# Patient Record
Sex: FEMALE | Race: AMERICAN INDIAN OR ALASKA NATIVE
[De-identification: names, ages, dates, MRNs, and addresses within clinical notes are randomized per-mention and may not be internally consistent; named-entity substitution may affect disease eponyms.]

---

## 2019-08-31 NOTE — EMERGENCY DEPARTMENT REPORT
ED Female  HPI





- General


Chief complaint: Urogenital-Female


Stated complaint: ABNORMAL DISCHARGE


Time Seen by Provider: 19 09:09


Source: patient


Mode of arrival: Ambulatory


Limitations: No Limitations





- History of Present Illness


Initial comments: 





This is a 19-year-old -American female who presents to the emergency room

with vaginal discharge, urinary frequency, and dysuria for 2-3 days.  Patient 

reports possible risk of sexually transmitted diseases.  Last menstrual period 

2019,  A0.  She denies abdominal pain, back pain, nausea, vomiting, 

chills, or fever.


MD Complaint: vaginal discharge, possible STD


Onset/Timing: 3


-: days(s)


Location: suprapubic


Radiation: non-radiating


Severity: mild


Severity scale (0 -10): 3


Quality: cramping, burning


Consistency: intermittent


Improves with: none


Worsens with: urination


Are you Pregnant Now?: No


Last Menstrual Period: 19


EDC: 05/15/20


Associated Symptoms: vaginal discharge, abdominal pain.  denies: vaginal 

bleeding, nausea/vomiting, fever/chills, headaches, loss of appetite, dysuria, 

hematuria, rash, seizure, shortness of breath, syncope, weakness





- Related Data


Sexually active: Yes


: 1


Para: 1


A: 0


                                  Previous Rx's











 Medication  Instructions  Recorded  Last Taken  Type


 


Sulfamethoxazole/Trimethoprim 1 each PO BID #6 tablet 19 Unknown Rx





[Bactrim DS TAB]    











                                    Allergies











Allergy/AdvReac Type Severity Reaction Status Date / Time


 


No Known Allergies Allergy   Unverified 19 08:43














ED Review of Systems


ROS: 


Stated complaint: ABNORMAL DISCHARGE


Other details as noted in HPI





Constitutional: denies: chills, fever


Respiratory: denies: cough, shortness of breath, wheezing


Cardiovascular: denies: chest pain, palpitations


Gastrointestinal: abdominal pain.  denies: nausea, diarrhea


Genitourinary: urgency, dysuria, frequency, discharge.  denies: hematuria, 

abnormal menses, dyspareunia


Musculoskeletal: denies: back pain, joint swelling, arthralgia


Skin: denies: rash, lesions


Neurological: denies: headache, weakness, paresthesias


Psychiatric: denies: anxiety, depression





ED Past Medical Hx





- Past Medical History


Previous Medical History?: Yes


Additional medical history: Anemia





- Surgical History


Past Surgical History?: No





- Social History


Smoking Status: Never Smoker


Substance Use Type: None





- Medications


Home Medications: 


                                Home Medications











 Medication  Instructions  Recorded  Confirmed  Last Taken  Type


 


Sulfamethoxazole/Trimethoprim 1 each PO BID #6 tablet 19  Unknown Rx





[Bactrim DS TAB]     














ED Physical Exam





- General


Limitations: No Limitations


General appearance: alert, in no apparent distress





- Respiratory


Respiratory exam: Present: normal lung sounds bilaterally.  Absent: respiratory 

distress





- Cardiovascular


Cardiovascular Exam: Present: regular rate, normal rhythm.  Absent: systolic 

murmur, diastolic murmur, rubs, gallop





- GI/Abdominal


GI/Abdominal exam: Present: soft, normal bowel sounds.  Absent: distended, 

tenderness, guarding, rebound, rigid





- 


External exam: Present: normal external exam


Speculum exam: Present: vaginal discharge (malodorous yellowish white).  Absent:

erythema, cervical discharge, vaginal bleeding, foreign body, tissue, laceration


Bi-manual exam: Present: normal bi-manual exam.  Absent: cervical motion 

tendernes, adnexal tenderness, uterine enlargement, uterine tenderness





- Back Exam


Back exam: Absent: CVA tenderness (R), CVA tenderness (L)





- Neurological Exam


Neurological exam: Present: alert, oriented X3, normal gait





- Psychiatric


Psychiatric exam: Present: normal affect, normal mood





- Skin


Skin exam: Present: warm, dry, intact, normal color.  Absent: rash





ED Course


                                   Vital Signs











  19





  08:43


 


Temperature 98.2 F


 


Pulse Rate 90


 


Respiratory 18





Rate 


 


Blood Pressure 102/57


 


O2 Sat by Pulse 100





Oximetry 














ED Medical Decision Making





- Lab Data








                                   Lab Results











  19 Range/Units





  08:55 


 


Urine Color  Yellow  (Yellow)  


 


Urine Turbidity  Clear  (Clear)  


 


Urine pH  6.0  (5.0-7.0)  


 


Ur Specific Gravity  1.030  (1.003-1.030)  


 


Urine Protein  <15 mg/dl  (Negative)  mg/dL


 


Urine Glucose (UA)  Neg  (Negative)  mg/dL


 


Urine Ketones  Neg  (Negative)  mg/dL


 


Urine Blood  Mod  (Negative)  


 


Urine Nitrite  Neg  (Negative)  


 


Ur Reducing Substances  Not Reportable  


 


Urine Bilirubin  Neg  (Negative)  


 


Urine Ictotest  Not Reportable  


 


Urine Urobilinogen  2.0  (<2.0)  mg/dL


 


Ur Leukocyte Esterase  Sm  (Negative)  


 


Urine WBC (Auto)  8.0 H  (0.0-6.0)  /HPF


 


Urine RBC (Auto)  49.0  (0.0-6.0)  /HPF


 


U Epithel Cells (Auto)  1.0  (0-13.0)  /HPF


 


Urine Bacteria (Auto)  1+  (Negative)  /HPF


 


Urine Mucus  3+  /HPF


 


Urine HCG, Qual  Negative  (Negative)  














- Medical Decision Making





Patient was examined by me.  Vitals are stable and in no acute distress.  

Urinalysis positive for UTI and urine pregnancy test negative.  Pelvic exam 

performed and wet prep and GC retrieved.  The wet prep is negative 

trich/yeast/clue cells.  Pending GC results.   Empirically treated with Rocephin

250 mg IM and azithromycin 1 g by mouth.  Instructed to follow up in 3-5 days 

for pending lab results.  Discharged home in stable condition.  Discussed 

prevention options. F/U with PCP or gynecology.


Critical care attestation.: 


If time is entered above; I have spent that time in minutes in the direct care 

of this critically ill patient, excluding procedure time.








ED Disposition


Clinical Impression: 


 Vaginal discharge, Dysuria, Urinary frequency, Exposure to STD





Acute cystitis


Qualifiers:


 Hematuria presence: with hematuria Qualified Code(s): N30.01 - Acute cystitis 

with hematuria





Disposition:  TO HOME OR SELFCARE


Is pt being admited?: No


Does the pt Need Aspirin: No


Condition: Stable


Instructions:  Urinary Tract Infection in Women (ED), Safe Sex (ED), Sexually 

Transmitted Diseases (ED)


Additional Instructions: 


Complete full course of antibiotics.





Return to medical records for a copy of your lab results in 3-5 days.





Follow up with a primary care doctor or gynecologist.


Prescriptions: 


Sulfamethoxazole/Trimethoprim [Bactrim DS TAB] 1 each PO BID #6 tablet


Referrals: 


[facility] [Outside] - 3-5 Days


[facility] [Outside] - 3-5 Days


[facility] [Outside] - 3-5 Days


Forms:  Work/School Release Form(ED)


Time of Disposition: 10:25

## 2019-09-18 NOTE — EMERGENCY DEPARTMENT REPORT
ED Back Pain/Injury HPI





- General


Chief Complaint: Abdominal Pain


Stated Complaint: RIGHT SIDE PAIN/BACK PAIN


Time Seen by Provider: 09/18/19 18:53


Source: patient


Limitations: No Limitations





- History of Present Illness


Initial Comments: 





19-year-old female department complaining of pain to her right flank posterior 

spontaneous nature for about 3 days.  States that she was sleeping she woke up 

with pain on the right side which is continuous.  The patient progressively 

worsening waxing waning fashion.  She reports no nausea, vomiting, no vaginal 

bleeding, no dysuria, no hematuria, fever, chills, sweats.  No constipation.  No

trauma.  No rashes.  Reports no history of any kidney stones.  She has tried 

some over-the-counter remedies for her pain with no improvement.


MD Complaint: back pain


-: days(s)


Similar Symptoms Previously: No


Radiation: none


Severity: moderate


Quality: sharp, dull


Consistency: constant


Worsens With: movement


Context: unknown


Associated Symptoms: denies: confusion, weakness, numbness, cough, incontinence,

fever/chills, constipation, abdominal pain, nausea/vomiting, rash, seizure, 

shortness of breath





- Related Data


                                  Previous Rx's











 Medication  Instructions  Recorded  Last Taken  Type


 


Sulfamethoxazole/Trimethoprim 1 each PO BID #6 tablet 08/31/19 Unknown Rx





[Bactrim DS TAB]    


 


Ketorolac [Toradol] 10 mg PO Q6H PRN #15 tablet 09/18/19 Unknown Rx


 


methOCARBAMOL [Robaxin TAB] 750 mg PO Q8H PRN #14 tablet 09/18/19 Unknown Rx











                                    Allergies











Allergy/AdvReac Type Severity Reaction Status Date / Time


 


No Known Allergies Allergy   Unverified 08/31/19 08:43














ED Review of Systems


ROS: 


Stated complaint: RIGHT SIDE PAIN/BACK PAIN


Other details as noted in HPI





Comment: All other systems reviewed and negative





ED Past Medical Hx





- Past Medical History


Previous Medical History?: No


Hx Hypertension: No


Hx CVA: No


Hx Heart Attack/AMI: No


Hx Congestive Heart Failure: No


Hx Diabetes: No


Hx Deep Vein Thrombosis: No


Hx Pulmonary Embolism: No


Hx GERD: No


Hx Liver Disease: No


Hx Renal Disease: No


Hx of Cancer: No


Hx Sickle Cell Disease: No


Hx Arthritis: No


Hx Headaches / Migraines: No


Hx Seizures: No


Hx Kidney Stones: No


Hx Psychiatric Treatment: No


Hx Asthma: No


Hx COPD: No


Hx Tuberculosis: No


Hx Dementia: No


Hx HIV: No


Additional medical history: Anemia





- Surgical History


Past Surgical History?: No


Hx Coronary Stent: No


Hx Open Heart Surgery: No


Hx Pacemaker: No


Hx Internal Defibrillator: No


Hx Cholecystectomy: No


Hx Appendectomy: No


Hx Breast Surgery: No





- Social History


Smoking Status: Never Smoker





- Medications


Home Medications: 


                                Home Medications











 Medication  Instructions  Recorded  Confirmed  Last Taken  Type


 


Sulfamethoxazole/Trimethoprim 1 each PO BID #6 tablet 08/31/19  Unknown Rx





[Bactrim DS TAB]     


 


Ketorolac [Toradol] 10 mg PO Q6H PRN #15 tablet 09/18/19  Unknown Rx


 


methOCARBAMOL [Robaxin TAB] 750 mg PO Q8H PRN #14 tablet 09/18/19  Unknown Rx














ED Physical Exam





- General


Limitations: No Limitations


General appearance: alert, in no apparent distress





- Head


Head exam: Present: atraumatic, normocephalic





- Eye


Eye exam: Present: normal appearance, PERRL


Pupils: Present: normal accommodation





- ENT


ENT exam: Present: normal exam, mucous membranes moist





- Neck


Neck exam: Present: normal inspection





- Respiratory


Respiratory exam: Present: normal lung sounds bilaterally.  Absent: respiratory 

distress





- Cardiovascular


Cardiovascular Exam: Present: regular rate, normal rhythm.  Absent: systolic 

murmur, diastolic murmur, rubs, gallop





- GI/Abdominal


GI/Abdominal exam: Present: soft, normal bowel sounds





- Extremities Exam


Extremities exam: Present: normal inspection





- Back Exam


Back exam: Present: normal inspection, full ROM, tenderness (to the right 

paraspinous region with palpation.), paraspinal tenderness, other (full range of

motion noted.  Negative seated straight leg raise.).  Absent: CVA tenderness 

(R), CVA tenderness (L), vertebral tenderness





- Neurological Exam


Neurological exam: Present: alert, oriented X3





- Psychiatric


Psychiatric exam: Present: normal affect, normal mood





- Skin


Skin exam: Present: warm, dry, intact, normal color.  Absent: rash (no rashes 

visualized.  No discoloration.), diaphoretic, erythema, urticaria, pallor, 

abrasion, ecchymosis





ED Course





                                   Vital Signs











  09/18/19





  18:51


 


Temperature 98 F


 


Pulse Rate 77


 


Respiratory 18





Rate 


 


Blood Pressure 138/54


 


Blood Pressure 123/82





[Right] 


 


O2 Sat by Pulse 100





Oximetry 














ED Medical Decision Making





- Medical Decision Making





19-year-old American female with right lower back pain, atraumatic in nature 

with a normal urinalysis and normal pregnancy tests(urinalysis did delayed the 

discharge as it was unable to be located for 2 hours).  Symptoms appear to be 

more musculoskeletal given her medical examination of reproduce her reproducible

 muscular pain and pain with axillary rotation to the right.  Patient isn't was 

however no acute distress afebrile nontoxic


Critical care attestation.: 


If time is entered above; I have spent that time in minutes in the direct care 

of this critically ill patient, excluding procedure time.








ED Disposition


Clinical Impression: 


 Lower back pain, Pain in right paraspinal region





Disposition: DC-01 TO HOME OR SELFCARE


Is pt being admited?: No


Does the pt Need Aspirin: No


Condition: Stable


Instructions:  Back Pain (ED)


Prescriptions: 


methOCARBAMOL [Robaxin TAB] 750 mg PO Q8H PRN #14 tablet


 PRN Reason: Pain, Moderate (4-6)


Ketorolac [Toradol] 10 mg PO Q6H PRN #15 tablet


 PRN Reason: Pain


Referrals: 


[facility] [Provider Group] - 3-5 Days

## 2019-09-18 NOTE — EMERGENCY DEPARTMENT REPORT
Blank Doc





- Documentation


Documentation: 





19-year-old female that presents with right flank pain.





This initial assessment/diagnostic orders/clinical plan/treatment(s) is/are 

subject to change based on patient's health status, clinical progression and re-

assessment by fellow clinical providers in the ED.  Further treatment and workup

at subsequent clinical providers discretion.  Patient/guardians urged not to 

elope from the ED as their condition may be serious if not clinically assessed 

and managed.  Initial orders include:


1- Patient sent to [facility] for further evaluation and treatment


2- UA

## 2020-09-29 ENCOUNTER — HOSPITAL ENCOUNTER (EMERGENCY)
Dept: HOSPITAL 5 - ED | Age: 20
Discharge: HOME | End: 2020-09-29
Payer: MEDICAID

## 2020-09-29 DIAGNOSIS — Z79.899: ICD-10-CM

## 2020-09-29 DIAGNOSIS — Z86.2: ICD-10-CM

## 2020-09-29 DIAGNOSIS — N76.0: Primary | ICD-10-CM

## 2020-09-29 DIAGNOSIS — B96.89: ICD-10-CM

## 2020-09-29 LAB
BILIRUB UR QL STRIP: (no result)
BLOOD UR QL VISUAL: (no result)
MUCOUS THREADS #/AREA URNS HPF: (no result) /HPF
PH UR STRIP: 6 [PH] (ref 5–7)
PROT UR STRIP-MCNC: (no result) MG/DL
RBC #/AREA URNS HPF: 2 /HPF (ref 0–6)
UROBILINOGEN UR-MCNC: 2 MG/DL (ref ?–2)
WBC #/AREA URNS HPF: 3 /HPF (ref 0–6)

## 2020-09-29 PROCEDURE — 81001 URINALYSIS AUTO W/SCOPE: CPT

## 2020-09-29 PROCEDURE — 87210 SMEAR WET MOUNT SALINE/INK: CPT

## 2020-09-29 PROCEDURE — 99284 EMERGENCY DEPT VISIT MOD MDM: CPT

## 2020-09-29 PROCEDURE — 87591 N.GONORRHOEAE DNA AMP PROB: CPT

## 2020-09-29 PROCEDURE — 81025 URINE PREGNANCY TEST: CPT

## 2020-09-29 NOTE — EMERGENCY DEPARTMENT REPORT
ED Female  HPI





- General


Chief complaint: Urogenital-Female


Stated complaint: peeing a lot/burning/discharge


Time Seen by Provider: 09/29/20 15:37


Source: patient


Mode of arrival: Ambulatory


Limitations: No Limitations





- History of Present Illness


Initial comments: 





30-year-old F American female just emerged department complaining of a few day 

history of vaginal discharge watery in nature with an odor of unknown color and 

some suprapubic discomfort is minimal in nature.  Ports no back pain no no no 

vaginal bleeding, no fever, chills, sweats no rashes no nausea, no vomiting, no 

diarrhea, no constipation.  States she had an STD profile last month which was 

normal but worried she may have an another issue going on here today.


MD Complaint: possible STD


Location: suprapubic, other


Radiation: non-radiating


Quality: dull


Improves with: none


Worsens with: none


Are you Pregnant Now?: No





- Related Data


                                  Previous Rx's











 Medication  Instructions  Recorded  Last Taken  Type


 


Sulfamethoxazole/Trimethoprim 1 each PO BID #6 tablet 08/31/19 Unknown Rx





[Bactrim DS TAB]    


 


Ketorolac [Toradol] 10 mg PO Q6H PRN #15 tablet 09/18/19 Unknown Rx


 


methOCARBAMOL [Robaxin TAB] 750 mg PO Q8H PRN #14 tablet 09/18/19 Unknown Rx


 


metroNIDAZOLE [Flagyl] 500 mg PO Q12HR #20 tab 09/29/20 Unknown Rx











                                    Allergies











Allergy/AdvReac Type Severity Reaction Status Date / Time


 


No Known Allergies Allergy   Unverified 08/31/19 08:43














ED Review of Systems


ROS: 


Stated complaint: peeing a lot/burning/discharge


Other details as noted in HPI





Comment: All other systems reviewed and negative





ED Past Medical Hx





- Past Medical History


Hx Hypertension: No


Hx CVA: No


Hx Heart Attack/AMI: No


Hx Congestive Heart Failure: No


Hx Diabetes: No


Hx Deep Vein Thrombosis: No


Hx Pulmonary Embolism: No


Hx GERD: No


Hx Liver Disease: No


Hx Renal Disease: No


Hx Sickle Cell Disease: No


Hx Arthritis: No


Hx Headaches / Migraines: No


Hx Seizures: No


Hx Kidney Stones: No


Hx Psychiatric Treatment: No


Hx Asthma: No


Hx COPD: No


Hx Tuberculosis: No


Hx Dementia: No


Hx HIV: No


Additional medical history: Anemia





- Surgical History


Hx Coronary Stent: No


Hx Open Heart Surgery: No


Hx Pacemaker: No


Hx Internal Defibrillator: No


Hx Cholecystectomy: No


Hx Appendectomy: No


Hx Breast Surgery: No





- Social History


Smoking Status: Never Smoker


Substance Use Type: Alcohol





- Medications


Home Medications: 


                                Home Medications











 Medication  Instructions  Recorded  Confirmed  Last Taken  Type


 


Sulfamethoxazole/Trimethoprim 1 each PO BID #6 tablet 08/31/19  Unknown Rx





[Bactrim DS TAB]     


 


Ketorolac [Toradol] 10 mg PO Q6H PRN #15 tablet 09/18/19  Unknown Rx


 


methOCARBAMOL [Robaxin TAB] 750 mg PO Q8H PRN #14 tablet 09/18/19  Unknown Rx


 


metroNIDAZOLE [Flagyl] 500 mg PO Q12HR #20 tab 09/29/20  Unknown Rx














ED Physical Exam





- General


Limitations: No Limitations


General appearance: alert, in no apparent distress





- Head


Head exam: Present: atraumatic, normocephalic





- Eye


Eye exam: Present: normal appearance





- ENT


ENT exam: Present: mucous membranes moist





- Neck


Neck exam: Present: normal inspection





- Respiratory


Respiratory exam: Present: normal lung sounds bilaterally.  Absent: respiratory 

distress





- Cardiovascular


Cardiovascular Exam: Present: regular rate, normal rhythm.  Absent: systolic 

murmur, diastolic murmur, rubs, gallop





- GI/Abdominal


GI/Abdominal exam: Present: soft, normal bowel sounds





- 


Speculum exam: Present: vaginal discharge





- Extremities Exam


Extremities exam: Present: normal inspection





- Back Exam


Back exam: Present: normal inspection





- Neurological Exam


Neurological exam: Present: alert, oriented X3





- Psychiatric


Psychiatric exam: Present: normal affect, normal mood





- Skin


Skin exam: Present: warm, dry, intact, normal color.  Absent: rash


Critical care attestation.: 


If time is entered above; I have spent that time in minutes in the direct care 

of this critically ill patient, excluding procedure time.








ED Disposition


Clinical Impression: 


 Bacterial vaginosis





Disposition: DC-01 TO HOME OR SELFCARE


Is pt being admited?: No


Does the pt Need Aspirin: No


Condition: Stable


Instructions:  Bacterial Vaginosis (ED)


Prescriptions: 


metroNIDAZOLE [Flagyl] 500 mg PO Q12HR #20 tab


Referrals: 


[facility] [Primary Care Provider] - 3-5 Days


[facility] [Provider Group] - 3-5 Days


Forms:  STI Treatment and Prevention